# Patient Record
Sex: FEMALE | Race: WHITE | HISPANIC OR LATINO | URBAN - METROPOLITAN AREA
[De-identification: names, ages, dates, MRNs, and addresses within clinical notes are randomized per-mention and may not be internally consistent; named-entity substitution may affect disease eponyms.]

---

## 2021-09-16 ENCOUNTER — APPOINTMENT (RX ONLY)
Dept: URBAN - METROPOLITAN AREA CLINIC 84 | Facility: CLINIC | Age: 36
Setting detail: DERMATOLOGY
End: 2021-09-16

## 2021-09-16 DIAGNOSIS — L81.4 OTHER MELANIN HYPERPIGMENTATION: ICD-10-CM

## 2021-09-16 DIAGNOSIS — D22 MELANOCYTIC NEVI: ICD-10-CM

## 2021-09-16 DIAGNOSIS — D485 NEOPLASM OF UNCERTAIN BEHAVIOR OF SKIN: ICD-10-CM

## 2021-09-16 DIAGNOSIS — L65.0 TELOGEN EFFLUVIUM: ICD-10-CM

## 2021-09-16 PROBLEM — D48.5 NEOPLASM OF UNCERTAIN BEHAVIOR OF SKIN: Status: ACTIVE | Noted: 2021-09-16

## 2021-09-16 PROBLEM — D22.71 MELANOCYTIC NEVI OF RIGHT LOWER LIMB, INCLUDING HIP: Status: ACTIVE | Noted: 2021-09-16

## 2021-09-16 PROBLEM — D22.5 MELANOCYTIC NEVI OF TRUNK: Status: ACTIVE | Noted: 2021-09-16

## 2021-09-16 PROCEDURE — ? ORDER TESTS

## 2021-09-16 PROCEDURE — ? PHOTO-DOCUMENTATION

## 2021-09-16 PROCEDURE — ? COUNSELING

## 2021-09-16 PROCEDURE — ? SUNSCREEN RECOMMENDATIONS

## 2021-09-16 PROCEDURE — 11102 TANGNTL BX SKIN SINGLE LES: CPT

## 2021-09-16 PROCEDURE — ? OBSERVATION AND MEASURE

## 2021-09-16 PROCEDURE — ? ADDITIONAL NOTES

## 2021-09-16 PROCEDURE — ? BIOPSY BY SHAVE METHOD

## 2021-09-16 PROCEDURE — 99203 OFFICE O/P NEW LOW 30 MIN: CPT | Mod: 25

## 2021-09-16 ASSESSMENT — LOCATION SIMPLE DESCRIPTION DERM
LOCATION SIMPLE: RIGHT KNEE
LOCATION SIMPLE: POSTERIOR SCALP
LOCATION SIMPLE: CHEST
LOCATION SIMPLE: LEFT EYELID

## 2021-09-16 ASSESSMENT — LOCATION ZONE DERM
LOCATION ZONE: TRUNK
LOCATION ZONE: SCALP
LOCATION ZONE: EYELID
LOCATION ZONE: LEG

## 2021-09-16 ASSESSMENT — LOCATION DETAILED DESCRIPTION DERM
LOCATION DETAILED: RIGHT LATERAL KNEE
LOCATION DETAILED: LEFT MEDIAL SUPERIOR CHEST
LOCATION DETAILED: LEFT LATERAL CANTHUS
LOCATION DETAILED: POSTERIOR MID-PARIETAL SCALP

## 2021-09-16 NOTE — PROCEDURE: BIOPSY BY SHAVE METHOD

## 2021-09-16 NOTE — PROCEDURE: ADDITIONAL NOTES
Additional Notes: Pt had previous biopsy of nevus that was benign
Detail Level: Simple
Render Risk Assessment In Note?: no

## 2021-09-16 NOTE — HPI: HAIR LOSS
Previous Labs: No
How Did The Hair Loss Occur?: gradual in onset
What Hair Products Do You Use?: Nioxin 1 shampoo and conditioner

## 2021-10-26 ENCOUNTER — APPOINTMENT (RX ONLY)
Dept: URBAN - METROPOLITAN AREA CLINIC 84 | Facility: CLINIC | Age: 36
Setting detail: DERMATOLOGY
End: 2021-10-26

## 2021-10-26 VITALS — HEART RATE: 84 BPM | SYSTOLIC BLOOD PRESSURE: 141 MMHG | DIASTOLIC BLOOD PRESSURE: 80 MMHG

## 2021-10-26 PROBLEM — C44.1192 BASAL CELL CARCINOMA OF SKIN OF LEFT LOWER EYELID, INCLUDING CANTHUS: Status: ACTIVE | Noted: 2021-10-26

## 2021-10-26 PROCEDURE — ? MOHS SURGERY

## 2021-10-26 PROCEDURE — 13151 CMPLX RPR E/N/E/L 1.1-2.5 CM: CPT

## 2021-10-26 PROCEDURE — ? ADDITIONAL NOTES

## 2021-10-26 PROCEDURE — ? REPAIR NOTE

## 2021-10-26 PROCEDURE — 17311 MOHS 1 STAGE H/N/HF/G: CPT

## 2021-10-26 NOTE — PROCEDURE: MOHS SURGERY
Referred To Plastics For Closure Text (Leave Blank If You Do Not Want): After obtaining clear surgical margins the patient was sent to plastics for surgical repair.  The patient understands they will receive post-surgical care and follow-up from Dr. Millan.

## 2021-10-26 NOTE — PROCEDURE: MOHS SURGERY
normal... Island Pedicle Flap Text: The defect edges were debeveled with a #15 scalpel blade.  Given the location of the defect, shape of the defect and the proximity to free margins an island pedicle advancement flap was deemed most appropriate.  Using a sterile surgical marker, an appropriate advancement flap was drawn incorporating the defect, outlining the appropriate donor tissue and placing the expected incisions within the relaxed skin tension lines where possible.    The area thus outlined was incised deep to adipose tissue with a #15 scalpel blade.  The skin margins were undermined to an appropriate distance in all directions around the primary defect and laterally outward around the island pedicle utilizing iris scissors.  There was minimal undermining beneath the pedicle flap.

## 2021-10-26 NOTE — PROCEDURE: REPAIR NOTE
Pre-Surgical Optimization Evaluation    Planned Procedure: Scheduled for Procedure(s) with comments:  HIP BURSECTOMY - Right Trochanteric Bursectomy of Hip and right Abductor Repair of Hip  HIP ABDUCTOR REPAIR - Right Trochanteric Bursectomy of Hip and right Abductor Repair of Hip on 10/11/2017 with  Surgeon(s):  José Miguel Lawrence MDfor Trochanteric bursitis of right hip [M70.61]  Muscle strain of gluteal region, right, initial encounter [S76.011A].     Planned Anesthesia: General    Pre-Op Consultations:  PCP: Preop evaluation completed by Dr. Heyden on 09/22/2017. No further testing indicated.     Pertinent Medical History:  1. Current every day smoker, 0.5 ppd, 15 pack year history    RCRI score: 0    Estimated body mass index is 29.04 kg/m² as calculated from the following:    Height as of 9/25/17: 5' 6\" (1.676 m).    Weight as of 9/25/17: 81.6 kg.    Previous Anesthesia History:  Negative for past history of problematic or difficult intubations.  Negative for personal history of prior anesthesia complications or reactions.  Negative for family history of anesthesic reactions.  Negative for personal history of bleeding or clotting disorders.  Negative for family history of bleeding or clotting disorders.    Pre-surgical optimization evaluation complete. Patient is low risk for intermediate risk procedure. Pertinent studies as follows.       ECG 09/22/2017  Sinus  Rhythm       Normal ECG            CXR 09/22/2017     IMPRESSION:     Stable chest, without evidence of acute disease.      Most Recent Laboratory Results:  Lab Results   Component Value Date    SODIUM 141 09/22/2017    POTASSIUM 4.9 09/22/2017    BUN 13 09/22/2017    CREATININE 0.62 09/22/2017    GLUCOSE 87 09/22/2017    WBC 8.3 09/22/2017    HGB 14.6 09/22/2017    HCT 44.2 09/22/2017     09/22/2017       Past Medical History:      Varicose veins                                                Acid reflux                                                    Osteoarthrosis, unspecified whether generalize*               Chronic eczema                                                DJD (degenerative joint disease)                              Allergic rhinitis                                              Past Surgical History:     PARTIAL KNEE ARTHROPLASTY                       8/16/10         Comment: Right Medial, Dr. Lawrence    BREAST BIOPSY                                   1995          TUMOR REMOVAL                                                   Comment: Sarcoma    TRIGGER FINGER RELEASE                                          Comment: both thumbs    REPAIR ABDUCTOR TENDON, HIP                     11/17/14        Comment: Left, Dr. Lawrence    9/26/2017  Verna Heller, NP   Tarsorrhaphy Text: A tarsorrhaphy was performed using Frost sutures.

## 2021-10-26 NOTE — PROCEDURE: MOHS SURGERY
Referred To Mid-Level For Closure Text (Leave Blank If You Do Not Want): After obtaining clear surgical margins the patient was sent to Harley Pringle PA-C for surgical repair.  The patient understands they will receive post-surgical care and follow-up from the mid-level provider.

## 2022-06-09 NOTE — PROCEDURE: MOHS SURGERY
Attempted to reach patient to discuss allergy testing questions.  No answer, left a message requesting a return call.    Marshall Walker RN on 6/9/2022 at 1:48 PM     Mauc Instructions: By selecting yes to the question below the MAUC number will be added into the note.  This will be calculated automatically based on the diagnosis chosen, the size entered, the body zone selected (H,M,L) and the specific indications you chose. You will also have the option to override the Mohs AUC if you disagree with the automatically calculated number and this option is found in the Case Summary tab.

## 2023-12-08 ENCOUNTER — APPOINTMENT (RX ONLY)
Dept: URBAN - METROPOLITAN AREA CLINIC 84 | Facility: CLINIC | Age: 38
Setting detail: DERMATOLOGY
End: 2023-12-08

## 2023-12-08 DIAGNOSIS — L73.8 OTHER SPECIFIED FOLLICULAR DISORDERS: ICD-10-CM

## 2023-12-08 DIAGNOSIS — L82.1 OTHER SEBORRHEIC KERATOSIS: ICD-10-CM

## 2023-12-08 DIAGNOSIS — L81.4 OTHER MELANIN HYPERPIGMENTATION: ICD-10-CM

## 2023-12-08 DIAGNOSIS — D22 MELANOCYTIC NEVI: ICD-10-CM

## 2023-12-08 DIAGNOSIS — Z85.828 PERSONAL HISTORY OF OTHER MALIGNANT NEOPLASM OF SKIN: ICD-10-CM

## 2023-12-08 DIAGNOSIS — L57.8 OTHER SKIN CHANGES DUE TO CHRONIC EXPOSURE TO NONIONIZING RADIATION: ICD-10-CM

## 2023-12-08 PROBLEM — D22.5 MELANOCYTIC NEVI OF TRUNK: Status: ACTIVE | Noted: 2023-12-08

## 2023-12-08 PROCEDURE — ? SUNSCREEN RECOMMENDATIONS

## 2023-12-08 PROCEDURE — ? FULL BODY SKIN EXAM

## 2023-12-08 PROCEDURE — 99213 OFFICE O/P EST LOW 20 MIN: CPT

## 2023-12-08 PROCEDURE — ? COUNSELING

## 2023-12-08 ASSESSMENT — LOCATION DETAILED DESCRIPTION DERM
LOCATION DETAILED: EPIGASTRIC SKIN
LOCATION DETAILED: STERNUM
LOCATION DETAILED: RIGHT POSTERIOR NECK
LOCATION DETAILED: RIGHT SUPERIOR FOREHEAD
LOCATION DETAILED: PERIUMBILICAL SKIN
LOCATION DETAILED: RIGHT CENTRAL MALAR CHEEK

## 2023-12-08 ASSESSMENT — LOCATION SIMPLE DESCRIPTION DERM
LOCATION SIMPLE: CHEST
LOCATION SIMPLE: POSTERIOR NECK
LOCATION SIMPLE: RIGHT CHEEK
LOCATION SIMPLE: ABDOMEN
LOCATION SIMPLE: RIGHT FOREHEAD

## 2023-12-08 ASSESSMENT — LOCATION ZONE DERM
LOCATION ZONE: FACE
LOCATION ZONE: TRUNK
LOCATION ZONE: NECK